# Patient Record
Sex: FEMALE | Race: WHITE | Employment: UNEMPLOYED | URBAN - NONMETROPOLITAN AREA
[De-identification: names, ages, dates, MRNs, and addresses within clinical notes are randomized per-mention and may not be internally consistent; named-entity substitution may affect disease eponyms.]

---

## 2019-07-10 ENCOUNTER — HOSPITAL ENCOUNTER (EMERGENCY)
Age: 49
Discharge: HOME | End: 2019-07-10
Attending: EMERGENCY MEDICINE
Payer: COMMERCIAL

## 2019-07-10 VITALS
OXYGEN SATURATION: 98 % | BODY MASS INDEX: 27.32 KG/M2 | WEIGHT: 170 LBS | HEIGHT: 66 IN | DIASTOLIC BLOOD PRESSURE: 99 MMHG | RESPIRATION RATE: 16 BRPM | HEART RATE: 92 BPM | SYSTOLIC BLOOD PRESSURE: 156 MMHG | TEMPERATURE: 98.5 F

## 2019-07-10 DIAGNOSIS — H92.01 OTALGIA OF RIGHT EAR: Primary | ICD-10-CM

## 2019-07-10 DIAGNOSIS — H60.391 INFECTIVE OTITIS EXTERNA OF RIGHT EAR: ICD-10-CM

## 2019-07-10 PROCEDURE — 99282 EMERGENCY DEPT VISIT SF MDM: CPT

## 2019-07-10 RX ORDER — AZITHROMYCIN 250 MG/1
TABLET, FILM COATED ORAL
Qty: 1 PACKET | Refills: 0 | Status: SHIPPED | OUTPATIENT
Start: 2019-07-10 | End: 2022-04-09

## 2019-07-10 RX ORDER — CLONAZEPAM 0.5 MG/1
0.5 TABLET ORAL 2 TIMES DAILY PRN
COMMUNITY

## 2019-07-10 RX ORDER — SERTRALINE HYDROCHLORIDE 100 MG/1
100 TABLET, FILM COATED ORAL DAILY
COMMUNITY

## 2019-07-10 RX ORDER — NEOMYCIN SULFATE, POLYMYXIN B SULFATE, HYDROCORTISONE 3.5; 10000; 1 MG/ML; [USP'U]/ML; MG/ML
2 SOLUTION/ DROPS AURICULAR (OTIC) 3 TIMES DAILY
Qty: 10 ML | Refills: 0 | Status: SHIPPED | OUTPATIENT
Start: 2019-07-10 | End: 2019-07-20

## 2019-07-10 RX ORDER — ACETAMINOPHEN 500 MG
500 TABLET ORAL EVERY 6 HOURS PRN
Qty: 20 TABLET | Refills: 0 | Status: SHIPPED | OUTPATIENT
Start: 2019-07-10

## 2019-07-10 RX ORDER — TRAMADOL HYDROCHLORIDE 50 MG/1
50 TABLET ORAL EVERY 6 HOURS PRN
Qty: 10 TABLET | Refills: 0 | Status: SHIPPED | OUTPATIENT
Start: 2019-07-10 | End: 2019-07-13

## 2019-07-10 ASSESSMENT — ENCOUNTER SYMPTOMS
SHORTNESS OF BREATH: 0
BACK PAIN: 0
FACIAL SWELLING: 0
EYE DISCHARGE: 0
COUGH: 0
ABDOMINAL PAIN: 0
RHINORRHEA: 0

## 2019-07-10 ASSESSMENT — PAIN DESCRIPTION - ORIENTATION: ORIENTATION: RIGHT

## 2019-07-10 ASSESSMENT — PAIN SCALES - GENERAL: PAINLEVEL_OUTOF10: 9

## 2019-07-10 ASSESSMENT — PAIN DESCRIPTION - PAIN TYPE: TYPE: ACUTE PAIN

## 2019-07-10 ASSESSMENT — PAIN DESCRIPTION - DESCRIPTORS: DESCRIPTORS: ACHING

## 2019-07-10 ASSESSMENT — PAIN DESCRIPTION - LOCATION: LOCATION: EAR

## 2022-04-09 ENCOUNTER — APPOINTMENT (OUTPATIENT)
Dept: CT IMAGING | Age: 52
End: 2022-04-09
Payer: COMMERCIAL

## 2022-04-09 ENCOUNTER — APPOINTMENT (OUTPATIENT)
Dept: GENERAL RADIOLOGY | Age: 52
End: 2022-04-09
Payer: COMMERCIAL

## 2022-04-09 ENCOUNTER — HOSPITAL ENCOUNTER (INPATIENT)
Age: 52
LOS: 1 days | Discharge: HOME | End: 2022-04-10
Attending: STUDENT IN AN ORGANIZED HEALTH CARE EDUCATION/TRAINING PROGRAM | Admitting: INTERNAL MEDICINE
Payer: COMMERCIAL

## 2022-04-09 DIAGNOSIS — R41.82 ALTERED MENTAL STATUS, UNSPECIFIED ALTERED MENTAL STATUS TYPE: ICD-10-CM

## 2022-04-09 DIAGNOSIS — F19.10 POLYSUBSTANCE ABUSE (HCC): ICD-10-CM

## 2022-04-09 DIAGNOSIS — F10.920 ACUTE ALCOHOLIC INTOXICATION WITHOUT COMPLICATION (HCC): Primary | ICD-10-CM

## 2022-04-09 DIAGNOSIS — N30.00 ACUTE CYSTITIS WITHOUT HEMATURIA: ICD-10-CM

## 2022-04-09 PROBLEM — F10.220 ACUTE ALCOHOL INTOXICATION WITH ALCOHOLISM, UNCOMPLICATED (HCC): Status: ACTIVE | Noted: 2022-04-09

## 2022-04-09 PROBLEM — G93.41 ACUTE METABOLIC ENCEPHALOPATHY: Status: ACTIVE | Noted: 2022-04-09

## 2022-04-09 PROBLEM — F12.10 MARIJUANA ABUSE: Status: ACTIVE | Noted: 2022-04-09

## 2022-04-09 PROBLEM — I10 PRIMARY HYPERTENSION: Status: ACTIVE | Noted: 2022-04-09

## 2022-04-09 LAB
A/G RATIO: 1.6 (ref 1.1–2.2)
ACETAMINOPHEN LEVEL: <5 UG/ML (ref 10–30)
ALBUMIN SERPL-MCNC: 4.4 G/DL (ref 3.4–5)
ALP BLD-CCNC: 94 U/L (ref 40–129)
ALT SERPL-CCNC: 31 U/L (ref 10–40)
AMPHETAMINE SCREEN, URINE: ABNORMAL
ANION GAP SERPL CALCULATED.3IONS-SCNC: 15 MMOL/L (ref 3–16)
AST SERPL-CCNC: 37 U/L (ref 15–37)
BACTERIA: ABNORMAL /HPF
BARBITURATE SCREEN URINE: ABNORMAL
BASE EXCESS VENOUS: -3.8 MMOL/L (ref -3–3)
BASOPHILS ABSOLUTE: 0.1 K/UL (ref 0–0.2)
BASOPHILS RELATIVE PERCENT: 0.7 %
BENZODIAZEPINE SCREEN, URINE: ABNORMAL
BILIRUB SERPL-MCNC: <0.2 MG/DL (ref 0–1)
BILIRUBIN URINE: NEGATIVE
BLOOD, URINE: NEGATIVE
BUN BLDV-MCNC: 10 MG/DL (ref 7–20)
CALCIUM SERPL-MCNC: 9.2 MG/DL (ref 8.3–10.6)
CANNABINOID SCREEN URINE: POSITIVE
CARBOXYHEMOGLOBIN: 5.5 % (ref 0–1.5)
CHLORIDE BLD-SCNC: 104 MMOL/L (ref 99–110)
CLARITY: CLEAR
CO2: 22 MMOL/L (ref 21–32)
COCAINE METABOLITE SCREEN URINE: ABNORMAL
COLOR: YELLOW
CREAT SERPL-MCNC: 0.6 MG/DL (ref 0.6–1.1)
EOSINOPHILS ABSOLUTE: 0.1 K/UL (ref 0–0.6)
EOSINOPHILS RELATIVE PERCENT: 1.2 %
EPITHELIAL CELLS, UA: ABNORMAL /HPF (ref 0–5)
ETHANOL: 334 MG/DL (ref 0–0.08)
GFR AFRICAN AMERICAN: >60
GFR NON-AFRICAN AMERICAN: >60
GLUCOSE BLD-MCNC: 147 MG/DL (ref 70–99)
GLUCOSE URINE: NEGATIVE MG/DL
HCO3 VENOUS: 23.4 MMOL/L (ref 23–29)
HCT VFR BLD CALC: 40.2 % (ref 36–48)
HEMOGLOBIN: 13.7 G/DL (ref 12–16)
HYALINE CASTS: ABNORMAL /LPF (ref 0–2)
INFLUENZA A: NOT DETECTED
INFLUENZA B: NOT DETECTED
KETONES, URINE: NEGATIVE MG/DL
LEUKOCYTE ESTERASE, URINE: NEGATIVE
LYMPHOCYTES ABSOLUTE: 4 K/UL (ref 1–5.1)
LYMPHOCYTES RELATIVE PERCENT: 37.1 %
Lab: ABNORMAL
MCH RBC QN AUTO: 29.3 PG (ref 26–34)
MCHC RBC AUTO-ENTMCNC: 34 G/DL (ref 31–36)
MCV RBC AUTO: 86.4 FL (ref 80–100)
METHADONE SCREEN, URINE: ABNORMAL
METHEMOGLOBIN VENOUS: 0.3 %
MICROSCOPIC EXAMINATION: YES
MONOCYTES ABSOLUTE: 0.8 K/UL (ref 0–1.3)
MONOCYTES RELATIVE PERCENT: 7.3 %
MUCUS: ABNORMAL /LPF
NEUTROPHILS ABSOLUTE: 5.8 K/UL (ref 1.7–7.7)
NEUTROPHILS RELATIVE PERCENT: 53.7 %
NITRITE, URINE: POSITIVE
O2 CONTENT, VEN: 18 VOL %
O2 SAT, VEN: 93 %
O2 THERAPY: ABNORMAL
OPIATE SCREEN URINE: ABNORMAL
OXYCODONE URINE: POSITIVE
PCO2, VEN: 50.9 MMHG (ref 40–50)
PDW BLD-RTO: 14.5 % (ref 12.4–15.4)
PH UA: 5.5
PH UA: 5.5 (ref 5–8)
PH VENOUS: 7.28 (ref 7.35–7.45)
PHENCYCLIDINE SCREEN URINE: ABNORMAL
PLATELET # BLD: 355 K/UL (ref 135–450)
PMV BLD AUTO: 7.7 FL (ref 5–10.5)
PO2, VEN: 73.6 MMHG (ref 25–40)
POTASSIUM REFLEX MAGNESIUM: 3.6 MMOL/L (ref 3.5–5.1)
PROPOXYPHENE SCREEN: ABNORMAL
PROTEIN UA: NEGATIVE MG/DL
RBC # BLD: 4.65 M/UL (ref 4–5.2)
RBC UA: ABNORMAL /HPF (ref 0–4)
SALICYLATE, SERUM: <0.3 MG/DL (ref 15–30)
SARS-COV-2 RNA, RT PCR: NOT DETECTED
SODIUM BLD-SCNC: 141 MMOL/L (ref 136–145)
SPECIFIC GRAVITY UA: 1.02 (ref 1–1.03)
TCO2 CALC VENOUS: 25 MMOL/L
TOTAL CK: 88 U/L (ref 26–192)
TOTAL PROTEIN: 7.2 G/DL (ref 6.4–8.2)
TROPONIN: <0.01 NG/ML
URINE TYPE: ABNORMAL
UROBILINOGEN, URINE: 0.2 E.U./DL
WBC # BLD: 10.9 K/UL (ref 4–11)
WBC UA: ABNORMAL /HPF (ref 0–5)

## 2022-04-09 PROCEDURE — 82803 BLOOD GASES ANY COMBINATION: CPT

## 2022-04-09 PROCEDURE — 99283 EMERGENCY DEPT VISIT LOW MDM: CPT

## 2022-04-09 PROCEDURE — 2060000000 HC ICU INTERMEDIATE R&B

## 2022-04-09 PROCEDURE — 72125 CT NECK SPINE W/O DYE: CPT

## 2022-04-09 PROCEDURE — 96365 THER/PROPH/DIAG IV INF INIT: CPT

## 2022-04-09 PROCEDURE — 93005 ELECTROCARDIOGRAM TRACING: CPT | Performed by: STUDENT IN AN ORGANIZED HEALTH CARE EDUCATION/TRAINING PROGRAM

## 2022-04-09 PROCEDURE — 96375 TX/PRO/DX INJ NEW DRUG ADDON: CPT

## 2022-04-09 PROCEDURE — G0378 HOSPITAL OBSERVATION PER HR: HCPCS

## 2022-04-09 PROCEDURE — 1200000000 HC SEMI PRIVATE

## 2022-04-09 PROCEDURE — 94761 N-INVAS EAR/PLS OXIMETRY MLT: CPT

## 2022-04-09 PROCEDURE — 80053 COMPREHEN METABOLIC PANEL: CPT

## 2022-04-09 PROCEDURE — 82550 ASSAY OF CK (CPK): CPT

## 2022-04-09 PROCEDURE — 84484 ASSAY OF TROPONIN QUANT: CPT

## 2022-04-09 PROCEDURE — 70450 CT HEAD/BRAIN W/O DYE: CPT

## 2022-04-09 PROCEDURE — 6360000002 HC RX W HCPCS: Performed by: STUDENT IN AN ORGANIZED HEALTH CARE EDUCATION/TRAINING PROGRAM

## 2022-04-09 PROCEDURE — 81001 URINALYSIS AUTO W/SCOPE: CPT

## 2022-04-09 PROCEDURE — 82077 ASSAY SPEC XCP UR&BREATH IA: CPT

## 2022-04-09 PROCEDURE — 87636 SARSCOV2 & INF A&B AMP PRB: CPT

## 2022-04-09 PROCEDURE — 71045 X-RAY EXAM CHEST 1 VIEW: CPT

## 2022-04-09 PROCEDURE — 80143 DRUG ASSAY ACETAMINOPHEN: CPT

## 2022-04-09 PROCEDURE — 85025 COMPLETE CBC W/AUTO DIFF WBC: CPT

## 2022-04-09 PROCEDURE — 80179 DRUG ASSAY SALICYLATE: CPT

## 2022-04-09 PROCEDURE — 2580000003 HC RX 258: Performed by: STUDENT IN AN ORGANIZED HEALTH CARE EDUCATION/TRAINING PROGRAM

## 2022-04-09 PROCEDURE — 96374 THER/PROPH/DIAG INJ IV PUSH: CPT

## 2022-04-09 PROCEDURE — 2580000003 HC RX 258: Performed by: INTERNAL MEDICINE

## 2022-04-09 PROCEDURE — 2700000000 HC OXYGEN THERAPY PER DAY

## 2022-04-09 PROCEDURE — 80307 DRUG TEST PRSMV CHEM ANLYZR: CPT

## 2022-04-09 RX ORDER — NALOXONE HYDROCHLORIDE 1 MG/ML
4 INJECTION INTRAMUSCULAR; INTRAVENOUS; SUBCUTANEOUS ONCE
Status: COMPLETED | OUTPATIENT
Start: 2022-04-09 | End: 2022-04-09

## 2022-04-09 RX ORDER — LORAZEPAM 1 MG/1
1 TABLET ORAL
Status: DISCONTINUED | OUTPATIENT
Start: 2022-04-09 | End: 2022-04-10 | Stop reason: HOSPADM

## 2022-04-09 RX ORDER — LORAZEPAM 2 MG/ML
1 INJECTION INTRAMUSCULAR
Status: DISCONTINUED | OUTPATIENT
Start: 2022-04-09 | End: 2022-04-10 | Stop reason: HOSPADM

## 2022-04-09 RX ORDER — LORAZEPAM 2 MG/ML
3 INJECTION INTRAMUSCULAR
Status: DISCONTINUED | OUTPATIENT
Start: 2022-04-09 | End: 2022-04-10 | Stop reason: HOSPADM

## 2022-04-09 RX ORDER — LORAZEPAM 2 MG/1
2 TABLET ORAL
Status: DISCONTINUED | OUTPATIENT
Start: 2022-04-09 | End: 2022-04-10 | Stop reason: HOSPADM

## 2022-04-09 RX ORDER — LORAZEPAM 2 MG/ML
2 INJECTION INTRAMUSCULAR
Status: DISCONTINUED | OUTPATIENT
Start: 2022-04-09 | End: 2022-04-10 | Stop reason: HOSPADM

## 2022-04-09 RX ORDER — LOSARTAN POTASSIUM 50 MG/1
75 TABLET ORAL DAILY
COMMUNITY

## 2022-04-09 RX ORDER — ACETAMINOPHEN 650 MG/1
650 SUPPOSITORY RECTAL EVERY 6 HOURS PRN
Status: DISCONTINUED | OUTPATIENT
Start: 2022-04-09 | End: 2022-04-10 | Stop reason: HOSPADM

## 2022-04-09 RX ORDER — SERTRALINE HYDROCHLORIDE 100 MG/1
100 TABLET, FILM COATED ORAL DAILY
Status: DISCONTINUED | OUTPATIENT
Start: 2022-04-09 | End: 2022-04-10 | Stop reason: HOSPADM

## 2022-04-09 RX ORDER — SODIUM CHLORIDE 9 MG/ML
INJECTION, SOLUTION INTRAVENOUS PRN
Status: DISCONTINUED | OUTPATIENT
Start: 2022-04-09 | End: 2022-04-10 | Stop reason: HOSPADM

## 2022-04-09 RX ORDER — ONDANSETRON 4 MG/1
4 TABLET, ORALLY DISINTEGRATING ORAL EVERY 8 HOURS PRN
Status: DISCONTINUED | OUTPATIENT
Start: 2022-04-09 | End: 2022-04-10 | Stop reason: HOSPADM

## 2022-04-09 RX ORDER — SODIUM CHLORIDE 0.9 % (FLUSH) 0.9 %
5-40 SYRINGE (ML) INJECTION PRN
Status: DISCONTINUED | OUTPATIENT
Start: 2022-04-09 | End: 2022-04-10 | Stop reason: HOSPADM

## 2022-04-09 RX ORDER — LORAZEPAM 2 MG/ML
4 INJECTION INTRAMUSCULAR
Status: DISCONTINUED | OUTPATIENT
Start: 2022-04-09 | End: 2022-04-10 | Stop reason: HOSPADM

## 2022-04-09 RX ORDER — ACETAMINOPHEN 325 MG/1
650 TABLET ORAL EVERY 6 HOURS PRN
Status: DISCONTINUED | OUTPATIENT
Start: 2022-04-09 | End: 2022-04-10 | Stop reason: HOSPADM

## 2022-04-09 RX ORDER — POLYETHYLENE GLYCOL 3350 17 G/17G
17 POWDER, FOR SOLUTION ORAL DAILY PRN
Status: DISCONTINUED | OUTPATIENT
Start: 2022-04-09 | End: 2022-04-10 | Stop reason: HOSPADM

## 2022-04-09 RX ORDER — QUETIAPINE FUMARATE 50 MG/1
50 TABLET, FILM COATED ORAL DAILY
COMMUNITY

## 2022-04-09 RX ORDER — LOSARTAN POTASSIUM 25 MG/1
75 TABLET ORAL DAILY
Status: DISCONTINUED | OUTPATIENT
Start: 2022-04-09 | End: 2022-04-10 | Stop reason: HOSPADM

## 2022-04-09 RX ORDER — SODIUM CHLORIDE 0.9 % (FLUSH) 0.9 %
5-40 SYRINGE (ML) INJECTION EVERY 12 HOURS SCHEDULED
Status: DISCONTINUED | OUTPATIENT
Start: 2022-04-09 | End: 2022-04-10 | Stop reason: HOSPADM

## 2022-04-09 RX ORDER — SODIUM CHLORIDE 9 MG/ML
INJECTION, SOLUTION INTRAVENOUS CONTINUOUS
Status: DISCONTINUED | OUTPATIENT
Start: 2022-04-09 | End: 2022-04-10

## 2022-04-09 RX ORDER — OXYBUTYNIN CHLORIDE 5 MG/1
10 TABLET, EXTENDED RELEASE ORAL DAILY
Status: DISCONTINUED | OUTPATIENT
Start: 2022-04-09 | End: 2022-04-10 | Stop reason: HOSPADM

## 2022-04-09 RX ORDER — FAMOTIDINE 20 MG/1
20 TABLET, FILM COATED ORAL 2 TIMES DAILY
COMMUNITY

## 2022-04-09 RX ORDER — LANOLIN ALCOHOL/MO/W.PET/CERES
100 CREAM (GRAM) TOPICAL DAILY
Status: DISCONTINUED | OUTPATIENT
Start: 2022-04-09 | End: 2022-04-10 | Stop reason: HOSPADM

## 2022-04-09 RX ORDER — LORAZEPAM 2 MG/1
4 TABLET ORAL
Status: DISCONTINUED | OUTPATIENT
Start: 2022-04-09 | End: 2022-04-10 | Stop reason: HOSPADM

## 2022-04-09 RX ORDER — DOCUSATE SODIUM 100 MG/1
100 CAPSULE, LIQUID FILLED ORAL 2 TIMES DAILY
COMMUNITY

## 2022-04-09 RX ORDER — ONDANSETRON 2 MG/ML
4 INJECTION INTRAMUSCULAR; INTRAVENOUS EVERY 6 HOURS PRN
Status: DISCONTINUED | OUTPATIENT
Start: 2022-04-09 | End: 2022-04-10 | Stop reason: HOSPADM

## 2022-04-09 RX ORDER — OXYBUTYNIN CHLORIDE 10 MG/1
10 TABLET, EXTENDED RELEASE ORAL DAILY
COMMUNITY

## 2022-04-09 RX ADMIN — CEFTRIAXONE SODIUM 1000 MG: 1 INJECTION, POWDER, FOR SOLUTION INTRAMUSCULAR; INTRAVENOUS at 17:43

## 2022-04-09 RX ADMIN — SODIUM CHLORIDE: 9 INJECTION, SOLUTION INTRAVENOUS at 21:11

## 2022-04-09 RX ADMIN — NALOXONE HYDROCHLORIDE 4 MG: 1 INJECTION PARENTERAL at 16:57

## 2022-04-09 NOTE — ED NOTES
2264 12 lead EKG completed results given to Dr. Renzo Gordon for review      Jemima Rose  04/09/22 2376

## 2022-04-09 NOTE — ED NOTES
Perfect serve message to Dr. Maryse Ornelas @ [email or web address]  04/09/22 1714    Dr Maryse Ornelas returned the call @ [email or web address]  04/09/22 2869

## 2022-04-09 NOTE — ED PROVIDER NOTES
[address] ED      CHIEF COMPLAINT  Drug Overdose (pt found in driveway. unknown how long she had been there. squad states pt breathing approx 4 times a minute.  squad gave a total of 6 mg of narcan iv.  pt arrives breathing on her own but still not responding. 2 mg narcan given iv ion arrival.  pt beginning to moan.)     HISTORY OF PRESENT ILLNESS  Anjelica Cheema is a 46 y.o. female  who presents to the ED complaining of altered mental status. Patient was found down her driveway after reportedly drinking a significant amount of alcohol at a bar. Upon arrival in the ED, she is maintaining her airway but is unable to contribute to the history. She is moving extremities spontaneously to pain. She did receive 6 mg of Narcan prior to arrival without much improvement. No other complaints, modifying factors or associated symptoms. I have reviewed the following from the nursing documentation. Past Medical History:   Diagnosis Date    Arthritis     Bipolar disorder ([facility] [address]     Fibromyalgia      Past Surgical History:   Procedure Laterality Date    CHOLECYSTECTOMY      COSMETIC SURGERY      left ear    HYSTERECTOMY      SEPTOPLASTY      TUBAL LIGATION       No family history on file. Social History     Socioeconomic History    Marital status:       Spouse name: Not on file    Number of children: Not on file    Years of education: Not on file    Highest education level: Not on file   Occupational History    Not on file   Tobacco Use    Smoking status: Current Every Day Smoker     Packs/day: 0.50     Years: 26.00     Pack years: 13.00     Types: Cigarettes    Smokeless tobacco: Never Used   Substance and Sexual Activity    Alcohol use: No    Drug use: No    Sexual activity: Never   Other Topics Concern    Not on file   Social History Narrative    Not on file     Social Determinants of Health     Financial Resource Strain:     Difficulty of Paying Living Expenses: Not on file   Food Insecurity:     Worried About Running Out of Food in the Last Year: Not on file    Kenisha of Food in the Last Year: Not on file   Transportation Needs:     Lack of Transportation (Medical): Not on file    Lack of Transportation (Non-Medical):  Not on file   Physical Activity:     Days of Exercise per Week: Not on file    Minutes of Exercise per Session: Not on file   Stress:     Feeling of Stress : Not on file   Social Connections:     Frequency of Communication with Friends and Family: Not on file    Frequency of Social Gatherings with Friends and Family: Not on file    Attends Quaker Services: Not on file    Active Member of [address] [place] [address] [employer] or Organizations: Not on file    Attends Club or Organization Meetings: Not on file    Marital Status: Not on file   Intimate Partner Violence:     Fear of Current or Ex-Partner: Not on file    Emotionally Abused: Not on file    Physically Abused: Not on file    Sexually Abused: Not on file   Housing Stability:     Unable to Pay for Housing in the Last Year: Not on file    Number of [place] in the Last Year: Not on file    Unstable Housing in the Last Year: Not on file     Current Facility-Administered Medications   Medication Dose Route Frequency Provider Last Rate Last Admin    cefTRIAXone (ROCEPHIN) 1000 mg IVPB in 50 mL D5W minibag  1,000 mg IntraVENous Once Nayely Morales  mL/hr at 04/09/22 1743 1,000 mg at 04/09/22 1743     Current Outpatient Medications   Medication Sig Dispense Refill    docusate sodium (COLACE) 100 MG capsule Take 100 mg by mouth 2 times daily      losartan (COZAAR) 50 MG tablet Take 75 mg by mouth daily      oxybutynin (DITROPAN-XL) 10 MG extended release tablet Take 10 mg by mouth daily      QUEtiapine (SEROQUEL) 50 MG tablet Take 50 mg by mouth daily      famotidine (PEPCID) 20 MG tablet Take 20 mg by mouth 2 times daily      clonazePAM (KLONOPIN) 0.5 MG tablet Take 0.5 mg by mouth 2 times daily as needed.  sertraline (ZOLOFT) 100 MG tablet Take 100 mg by mouth daily       acetaminophen (APAP EXTRA STRENGTH) 500 MG tablet Take 1 tablet by mouth every 6 hours as needed for Pain 20 tablet 0     Allergies   Allergen Reactions    Motrin [Ibuprofen Micronized] Nausea And Vomiting    Penicillins Rash       REVIEW OF SYSTEMS  10 systems reviewed, pertinent positives per HPI otherwise noted to be negative. PHYSICAL EXAM  BP 97/61   Pulse 75   Temp 96.2 °F (35.7 °C) (Rectal)   Resp 18   SpO2 94%    GENERAL APPEARANCE: Somnolent however maintaining airway and in no acute distress. HENT: Normocephalic. Atraumatic. Mucous membranes are dry  NECK: Supple. EYES: PERRL. HEART/CHEST: RRR. No murmurs. LUNGS: Respirations unlabored. CTAB. Good air exchange. ABDOMEN: No tenderness. Soft. Non-distended. MUSCULOSKELETAL: No extremity edema. Compartments soft. No deformity. SKIN: Warm and dry. No acute rashes. NEUROLOGICAL: Somnolent, moves all extremities in response to pain but is unable to follow commands  PSYCHIATRIC: Unable to assess    LABS  I have reviewed all labs for this visit.    Results for orders placed or performed during the hospital encounter of 04/09/22   COVID-19 & Influenza Combo    Specimen: Nasopharyngeal Swab   Result Value Ref Range    SARS-CoV-2 RNA, RT PCR NOT DETECTED NOT DETECTED    INFLUENZA A NOT DETECTED NOT DETECTED    INFLUENZA B NOT DETECTED NOT DETECTED   CBC with Auto Differential   Result Value Ref Range    WBC 10.9 4.0 - 11.0 K/uL    RBC 4.65 4.00 - 5.20 M/uL    Hemoglobin 13.7 12.0 - 16.0 g/dL    Hematocrit 40.2 36.0 - 48.0 %    MCV 86.4 80.0 - 100.0 fL    MCH 29.3 26.0 - 34.0 pg    MCHC 34.0 31.0 - 36.0 g/dL    RDW 14.5 12.4 - 15.4 %    Platelets 201 765 - 860 K/uL    MPV 7.7 5.0 - 10.5 fL    Neutrophils % 53.7 %    Lymphocytes % 37.1 %    Monocytes % 7.3 %    Eosinophils % 1.2 %    Basophils % 0.7 %    Neutrophils Absolute 5.8 1.7 - 7.7 K/uL    Lymphocytes Absolute 4.0 1.0 - 5.1 K/uL    Monocytes Absolute 0.8 0.0 - 1.3 K/uL    Eosinophils Absolute 0.1 0.0 - 0.6 K/uL    Basophils Absolute 0.1 0.0 - 0.2 K/uL   Comprehensive Metabolic Panel w/ Reflex to MG   Result Value Ref Range    Sodium 141 136 - 145 mmol/L    Potassium reflex Magnesium 3.6 3.5 - 5.1 mmol/L    Chloride 104 99 - 110 mmol/L    CO2 22 21 - 32 mmol/L    Anion Gap 15 3 - 16    Glucose 147 (H) 70 - 99 mg/dL    BUN 10 7 - 20 mg/dL    CREATININE 0.6 0.6 - 1.1 mg/dL    GFR Non-African American >60 >60    GFR African American >60 >60    Calcium 9.2 8.3 - 10.6 mg/dL    Total Protein 7.2 6.4 - 8.2 g/dL    Albumin 4.4 3.4 - 5.0 g/dL    Albumin/Globulin Ratio 1.6 1.1 - 2.2    Total Bilirubin <0.2 0.0 - 1.0 mg/dL    Alkaline Phosphatase 94 40 - 129 U/L    ALT 31 10 - 40 U/L    AST 37 15 - 37 U/L   Troponin   Result Value Ref Range    Troponin <0.01 <0.01 ng/mL   Urinalysis with Microscopic   Result Value Ref Range    Color, UA Yellow Straw/Yellow    Clarity, UA Clear Clear    Glucose, Ur Negative Negative mg/dL    Bilirubin Urine Negative Negative    Ketones, Urine Negative Negative mg/dL    Specific Gravity, UA 1.020 1.005 - 1.030    Blood, Urine Negative Negative    pH, UA 5.5 5.0 - 8.0    Protein, UA Negative Negative mg/dL    Urobilinogen, Urine 0.2 <2.0 E.U./dL    Nitrite, Urine POSITIVE (A) Negative    Leukocyte Esterase, Urine Negative Negative    Microscopic Examination YES     Urine Type NotGiven     Hyaline Casts, UA 3-5 (A) 0 - 2 /LPF    Mucus, UA Rare (A) None Seen /LPF    WBC, UA 0-2 0 - 5 /HPF    RBC, UA 0-2 0 - 4 /HPF    Epithelial Cells, UA 0-1 0 - 5 /HPF    Bacteria, UA 3+ (A) None Seen /HPF   Drug screen multi urine   Result Value Ref Range    Amphetamine Screen, Urine Neg Negative <1000ng/mL    Barbiturate Screen, Ur Neg Negative <200 ng/mL    Benzodiazepine Screen, Urine Neg Negative <200 ng/mL    Cannabinoid Scrn, Ur POSITIVE (A) Negative <50 ng/mL    Cocaine Metabolite Screen, Urine Neg Negative <300 ng/mL    Opiate Scrn, Ur Neg Negative <300 ng/mL    PCP Screen, Urine Neg Negative <25 ng/mL    Methadone Screen, Urine Neg Negative <300 ng/mL    Propoxyphene Scrn, Ur Neg Negative <300 ng/mL    Oxycodone Urine POSITIVE (A) Negative <100 ng/ml    pH, UA 5.5     Drug Screen Comment: see below    Ethanol   Result Value Ref Range    Ethanol Lvl 334 mg/dL   Acetaminophen Level   Result Value Ref Range    Acetaminophen Level <5 (L) 10 - 30 ug/mL   Salicylate   Result Value Ref Range    Salicylate, Serum <2.2 (L) 15.0 - 30.0 mg/dL   Blood Gas, Venous   Result Value Ref Range    pH, Kirill 7.281 (L) 7.350 - 7.450    pCO2, Kirill 50.9 (H) 40.0 - 50.0 mmHg    pO2, Kirill 73.6 (H) 25.0 - 40.0 mmHg    HCO3, Venous 23.4 23.0 - 29.0 mmol/L    Base Excess, Kirill -3.8 (L) -3.0 - 3.0 mmol/L    O2 Sat, Kirill 93 Not Established %    Carboxyhemoglobin 5.5 (H) 0.0 - 1.5 %    MetHgb, Kirill 0.3 <1.5 %    TC02 (Calc), Kirill 25 Not Established mmol/L    O2 Content, Kirill 18 Not Established VOL %    O2 Therapy Unknown    CK   Result Value Ref Range    Total CK 88 26 - 192 U/L   EKG 12 Lead   Result Value Ref Range    Ventricular Rate 54 BPM    Atrial Rate 54 BPM    P-R Interval 158 ms    QRS Duration 96 ms    Q-T Interval 468 ms    QTc Calculation (Bazett) 443 ms    P Axis 68 degrees    R Axis 56 degrees    T Axis 68 degrees    Diagnosis Sinus bradycardiaOtherwise normal ECG        ECG  The Ekg interpreted by me shows  sinus bradycardia, rate=54  Axis is   Normal  QTc is  normal  Intervals and Durations are unremarkable. ST Segments: normal  Sinus bradycardia is new, otherwise no significant change compared to previous performed 11/15/2013    RADIOLOGY  XR CHEST PORTABLE   Final Result   Increased markings throughout the lungs, likely related to decreased lung   volumes. The possibility of mild edema or infiltrate is raised. CT CERVICAL SPINE WO CONTRAST   Final Result   1. No acute findings in the cervical spine.    2. Minimal to mild cervical spine degenerative changes. CT HEAD WO CONTRAST   Final Result   No acute intracranial abnormality. ED COURSE/MDM  Patient seen and evaluated. Old records reviewed. Labs and imaging reviewed and results discussed with patient. Patient is a 66-year-old female, presenting with concerns for altered mental status after being found down in her driveway. Full HPI as detailed above. Upon arrival in the ED, vitals are reassuring. Patient is moving all extremities pain but is unable to contribute to the history any further. Family members did arrive at bedside and confirmed that she was under significant amount of stress and went to a  earlier today and then went to a bar afterwards reportedly drank a large amount of alcohol. They report that she is not normally a drinker. Labs revealed no acute concerning electrolyte abnormalities. CK and troponin are within normal limits. Urine drug screen is positive for cannabinoids and oxycodone. Ethanol is elevated at 334. CT of the head and C-spine negative for any acute concerning findings. Chest x-ray with increased markings possibly due to decreased inspiratory effort. Urine drug screen was positive for oxycodone, per PDMP review she has not simply prescribed this. Also positive for cannabinoids and feel endorses that she does use marijuana for anxiety. She remained hemodynamically stable and maintained her airway requesting in the ED, feel that her symptoms are likely secondary to acute alcohol intoxication. She has no previous history of withdrawal per family and she is not normally a drinker and given her significantly elevated ethanol level here I believe that this is most likely the cause of her symptoms. Patient will be hospitalized for further work-up and treatment of her condition. The total Critical Care time is 45 minutes which excludes separately billable procedures.     During the patient's ED course, the patient was given:  Medications   cefTRIAXone (ROCEPHIN) 1000 mg IVPB in 50 mL D5W minibag (1,000 mg IntraVENous New Bag 4/9/22 1743)   naloxone [facility]) injection 4 mg (4 mg IntraVENous Given 4/9/22 1657)        CLINICAL IMPRESSION  1. Acute alcoholic intoxication without complication ([facility] [address]    2. Polysubstance abuse ([facility] [address]    3. Altered mental status, unspecified altered mental status type    4. Acute cystitis without hematuria        Blood pressure 97/61, pulse 75, temperature 96.2 °F (35.7 °C), temperature source Rectal, resp. rate 18, SpO2 94 %. Farhana George was admitted in fair condition. Patient was given scripts for the following medications. I counseled patient how to take these medications. New Prescriptions    No medications on file       Follow-up with:  No follow-up provider specified. DISCLAIMER: This chart was created using Dragon dictation software. Efforts were made by me to ensure accuracy, however some errors may be present due to limitations of this technology and occasionally words are not transcribed correctly.        Mynor Arceo MD  04/09/22 5096

## 2022-04-10 ENCOUNTER — APPOINTMENT (OUTPATIENT)
Dept: GENERAL RADIOLOGY | Age: 52
End: 2022-04-10
Payer: COMMERCIAL

## 2022-04-10 VITALS
OXYGEN SATURATION: 93 % | HEART RATE: 87 BPM | SYSTOLIC BLOOD PRESSURE: 128 MMHG | RESPIRATION RATE: 19 BRPM | WEIGHT: 212.4 LBS | BODY MASS INDEX: 34.28 KG/M2 | TEMPERATURE: 97.3 F | DIASTOLIC BLOOD PRESSURE: 75 MMHG

## 2022-04-10 LAB
ANION GAP SERPL CALCULATED.3IONS-SCNC: 13 MMOL/L (ref 3–16)
BASOPHILS ABSOLUTE: 0.1 K/UL (ref 0–0.2)
BASOPHILS RELATIVE PERCENT: 0.5 %
BUN BLDV-MCNC: 8 MG/DL (ref 7–20)
CALCIUM SERPL-MCNC: 8.6 MG/DL (ref 8.3–10.6)
CHLORIDE BLD-SCNC: 105 MMOL/L (ref 99–110)
CO2: 22 MMOL/L (ref 21–32)
CREAT SERPL-MCNC: <0.5 MG/DL (ref 0.6–1.1)
EKG ATRIAL RATE: 54 BPM
EKG DIAGNOSIS: NORMAL
EKG P AXIS: 68 DEGREES
EKG P-R INTERVAL: 158 MS
EKG Q-T INTERVAL: 468 MS
EKG QRS DURATION: 96 MS
EKG QTC CALCULATION (BAZETT): 443 MS
EKG R AXIS: 56 DEGREES
EKG T AXIS: 68 DEGREES
EKG VENTRICULAR RATE: 54 BPM
EOSINOPHILS ABSOLUTE: 0.1 K/UL (ref 0–0.6)
EOSINOPHILS RELATIVE PERCENT: 1.3 %
ETHANOL: 82 MG/DL (ref 0–0.08)
GFR AFRICAN AMERICAN: >60
GFR NON-AFRICAN AMERICAN: >60
GLUCOSE BLD-MCNC: 99 MG/DL (ref 70–99)
HCT VFR BLD CALC: 38.4 % (ref 36–48)
HEMOGLOBIN: 12.8 G/DL (ref 12–16)
LYMPHOCYTES ABSOLUTE: 4.4 K/UL (ref 1–5.1)
LYMPHOCYTES RELATIVE PERCENT: 40 %
MCH RBC QN AUTO: 28.8 PG (ref 26–34)
MCHC RBC AUTO-ENTMCNC: 33.3 G/DL (ref 31–36)
MCV RBC AUTO: 86.7 FL (ref 80–100)
MONOCYTES ABSOLUTE: 0.8 K/UL (ref 0–1.3)
MONOCYTES RELATIVE PERCENT: 7.1 %
NEUTROPHILS ABSOLUTE: 5.6 K/UL (ref 1.7–7.7)
NEUTROPHILS RELATIVE PERCENT: 51.1 %
PDW BLD-RTO: 14.9 % (ref 12.4–15.4)
PLATELET # BLD: 363 K/UL (ref 135–450)
PMV BLD AUTO: 7.2 FL (ref 5–10.5)
POTASSIUM REFLEX MAGNESIUM: 3.8 MMOL/L (ref 3.5–5.1)
PROCALCITONIN: 0.12 NG/ML (ref 0–0.15)
RBC # BLD: 4.43 M/UL (ref 4–5.2)
SODIUM BLD-SCNC: 140 MMOL/L (ref 136–145)
WBC # BLD: 11 K/UL (ref 4–11)

## 2022-04-10 PROCEDURE — 94761 N-INVAS EAR/PLS OXIMETRY MLT: CPT

## 2022-04-10 PROCEDURE — 96367 TX/PROPH/DG ADDL SEQ IV INF: CPT

## 2022-04-10 PROCEDURE — 82077 ASSAY SPEC XCP UR&BREATH IA: CPT

## 2022-04-10 PROCEDURE — 6360000002 HC RX W HCPCS: Performed by: HOSPITALIST

## 2022-04-10 PROCEDURE — G0378 HOSPITAL OBSERVATION PER HR: HCPCS

## 2022-04-10 PROCEDURE — 93010 ELECTROCARDIOGRAM REPORT: CPT | Performed by: INTERNAL MEDICINE

## 2022-04-10 PROCEDURE — 2580000003 HC RX 258: Performed by: INTERNAL MEDICINE

## 2022-04-10 PROCEDURE — 2700000000 HC OXYGEN THERAPY PER DAY

## 2022-04-10 PROCEDURE — 96366 THER/PROPH/DIAG IV INF ADDON: CPT

## 2022-04-10 PROCEDURE — 36415 COLL VENOUS BLD VENIPUNCTURE: CPT

## 2022-04-10 PROCEDURE — 6370000000 HC RX 637 (ALT 250 FOR IP): Performed by: INTERNAL MEDICINE

## 2022-04-10 PROCEDURE — 84145 PROCALCITONIN (PCT): CPT

## 2022-04-10 PROCEDURE — 99217 PR OBSERVATION CARE DISCHARGE MANAGEMENT: CPT | Performed by: INTERNAL MEDICINE

## 2022-04-10 PROCEDURE — 71045 X-RAY EXAM CHEST 1 VIEW: CPT

## 2022-04-10 PROCEDURE — 85025 COMPLETE CBC W/AUTO DIFF WBC: CPT

## 2022-04-10 PROCEDURE — 80048 BASIC METABOLIC PNL TOTAL CA: CPT

## 2022-04-10 RX ORDER — LEVOFLOXACIN 5 MG/ML
750 INJECTION, SOLUTION INTRAVENOUS EVERY 24 HOURS
Status: DISCONTINUED | OUTPATIENT
Start: 2022-04-10 | End: 2022-04-10

## 2022-04-10 RX ADMIN — SODIUM CHLORIDE: 9 INJECTION, SOLUTION INTRAVENOUS at 06:23

## 2022-04-10 RX ADMIN — Medication 100 MG: at 08:34

## 2022-04-10 RX ADMIN — ACETAMINOPHEN 650 MG: 325 TABLET ORAL at 06:25

## 2022-04-10 RX ADMIN — LEVOFLOXACIN 750 MG: 5 INJECTION, SOLUTION INTRAVENOUS at 04:23

## 2022-04-10 RX ADMIN — SERTRALINE 100 MG: 100 TABLET, FILM COATED ORAL at 08:35

## 2022-04-10 RX ADMIN — OXYBUTYNIN CHLORIDE 10 MG: 5 TABLET, EXTENDED RELEASE ORAL at 08:34

## 2022-04-10 RX ADMIN — LOSARTAN POTASSIUM 75 MG: 25 TABLET, FILM COATED ORAL at 08:34

## 2022-04-10 ASSESSMENT — PAIN SCALES - GENERAL: PAINLEVEL_OUTOF10: 10

## 2022-04-10 NOTE — PROGRESS NOTES
Bedside report given to oncoming RN Brandon Cruz. All questions and concerns answered. Patient handoff complete.

## 2022-04-10 NOTE — CARE COORDINATION
Tam Molina RN stated that pt was wanting to leaving and did not want a [place] and did not want to meet with CM. Pt's SpO2 is at 93% on RA. CM had a consult for pt for consideration for rehab, but pt did not want this. Pt discharged to home and per [facility], pt did not have any needs.       No further needs needed from

## 2022-04-10 NOTE — DISCHARGE SUMMARY
Normal breath sounds. No wheezing or rales. Abdominal:      General: Bowel sounds are normal. There is no distension. Palpations: Abdomen is soft. There is no hepatomegaly, splenomegaly or mass. Tenderness: There is no abdominal tenderness. Musculoskeletal:         General: Normal range of motion. Cervical back: Normal range of motion and neck supple. Lymphadenopathy:      Cervical: No cervical adenopathy. Skin:     General: Skin is warm and dry. Findings: No rash. Neurological:      Mental Status: She is alert and oriented to person, place, and time. Cranial Nerves: No cranial nerve deficit. Deep Tendon Reflexes: Reflexes are normal and symmetric. Psychiatric:         Behavior: Behavior normal.         Thought Content: Thought content normal.         Judgment: Judgment normal.           Hospital Course    57-year-old white female who does not have a history of alcoholism, history of depression and hypertension who was at a  yesterday. She was hanging out with some friends. She apparently went to a bar and had shots of tequila. She denies any chest pain shortness of abdominal pain nausea vomiting or urinalysis. She was brought to the ER after she was found in the driveway. Patient intoxicated the time of admission. Her Ethanol Level Was 334. Patient Also Taken Half a Percocet Pill from Her Friend for Some Back Issues. Patient Is Admitted to Hospital.    This Morning Patient Is Awake and Alert. Repeat Ethanol Level Is  82. Watched her for several more hours before discharge. Spoke to her daughter. Her daughter states her mom does not drink alcohol on a regular basis. She is have any prior history of alcoholism. She feels that her mom was at a friend's  and was hanging out with some friends and drank too much. She has an outpatient psychiatrist that the patient will follow with.     Patient will be discharged home and will resume her home meds.    Pneumonia was ruled out. Her lungs are clear. She denies any urinary symptoms. CBC:   Recent Labs     04/09/22  1529 04/10/22  0443   WBC 10.9 11.0   HGB 13.7 12.8   HCT 40.2 38.4   MCV 86.4 86.7    363     BMP:   Recent Labs     04/09/22  1529 04/10/22  0443    140   K 3.6 3.8    105   CO2 22 22   BUN 10 8   CREATININE 0.6 <0.5*     LIVER PROFILE:   Recent Labs     04/09/22  1529   AST 37   ALT 31   BILITOT <0.2   ALKPHOS 94     PT/INR: No results for input(s): PROTIME, INR in the last 72 hours. APTT: No results for input(s): APTT in the last 72 hours. UA:  Recent Labs     04/09/22  1529   COLORU Yellow   PHUR 5.5  5.5   WBCUA 0-2   RBCUA 0-2   MUCUS Rare*   BACTERIA 3+*   CLARITYU Clear   SPECGRAV 1.020   LEUKOCYTESUR Negative   UROBILINOGEN 0.2   BILIRUBINUR Negative   BLOODU Negative   GLUCOSEU Negative     Results for Zay Agent (MRN [id number]) as of 4/10/2022 07:38   Ref. Range 4/9/2022 15:29   Ethanol Lvl Latest Units: mg/dL 334   Amphetamine Screen, Urine Latest Ref Range: Negative <1000ng/mL  Neg   Barbiturate Screen, Ur Latest Ref Range: Negative <200 ng/mL  Neg   Benzodiazepine Screen, Urine Latest Ref Range: Negative <200 ng/mL  Neg   Cannabinoid Scrn, Ur Latest Ref Range: Negative <50 ng/mL  POSITIVE (A)   METHADONE SCREEN, URINE, 96000 Latest Ref Range: Negative <300 ng/mL  Neg   Opiate Scrn, Ur Latest Ref Range: Negative <300 ng/mL  Neg   PCP Screen, Urine Latest Ref Range: Negative <25 ng/mL  Neg   Propoxyphene Scrn, Ur Latest Ref Range: Negative <300 ng/mL  Neg   Cocaine Metabolite Screen, Urine Latest Ref Range: Negative <300 ng/mL  Neg   Oxycodone Urine Latest Ref Range: Negative <100 ng/ml  POSITIVE (A)         XR CHEST PORTABLE   Final Result   Increased markings throughout the lungs, likely related to decreased lung   volumes. The possibility of mild edema or infiltrate is raised. CT CERVICAL SPINE WO CONTRAST   Final Result   1.  No acute findings in the cervical spine. 2. Minimal to mild cervical spine degenerative changes. CT HEAD WO CONTRAST   Final Result   No acute intracranial abnormality. XR CHEST PORTABLE    (Results Pending)          Discharge Medications     Medication List      CONTINUE taking these medications    acetaminophen 500 MG tablet  Commonly known as: APAP Extra Strength  Take 1 tablet by mouth every 6 hours as needed for Pain     clonazePAM 0.5 MG tablet  Commonly known as: KLONOPIN     docusate sodium 100 MG capsule  Commonly known as: COLACE     famotidine 20 MG tablet  Commonly known as: PEPCID     losartan 50 MG tablet  Commonly known as: COZAAR     oxybutynin 10 MG extended release tablet  Commonly known as: DITROPAN-XL     QUEtiapine 50 MG tablet  Commonly known as: SEROQUEL     sertraline 100 MG tablet  Commonly known as: ZOLOFT              Discharge Condition/Location: Stable    Follow Up: Follow up with PCP.         Elridge Rubinstein, MD 4/10/2022 7:48 AM

## 2022-04-10 NOTE — FLOWSHEET NOTE
04/10/22 0830   Vital Signs   Temp 97.3 °F (36.3 °C)   Temp Source Oral   Pulse 87   Heart Rate Source Monitor   Resp 19   /75   BP Location Left upper arm   Patient Position Semi fowlers   Level of Consciousness Alert (0)   MEWS Score 1   Patient Currently in Pain Denies   Oxygen Therapy   SpO2 93 %   O2 Device None (Room air)   O2 Flow Rate (L/min) 0 L/min       Pt assessment complete; see flow sheets. Vitals completed. Meds given per MAR. Pt O/A x4. Weaned off oxygen. Pt stable at this time.     Sanegeta Aguilar RN

## 2022-04-10 NOTE — PROGRESS NOTES
Patient arrived from ED, report received from ED RN. Patient placed on tele box, bed placed in lowest position, clwr, alarm on.

## 2022-04-10 NOTE — PROGRESS NOTES
Patient educated on discharge instructions as well as new medications use, dosage, administration and possible side effects. Patient verified knowledge. IV removed without difficulty and dry dressing in place. Telemetry monitor removed and returned to Critical access hospital. Pt left facility in stable condition to Home with all of their personal belongings.      Gerardo Rangel RN